# Patient Record
Sex: FEMALE | Race: AMERICAN INDIAN OR ALASKA NATIVE | NOT HISPANIC OR LATINO | URBAN - METROPOLITAN AREA
[De-identification: names, ages, dates, MRNs, and addresses within clinical notes are randomized per-mention and may not be internally consistent; named-entity substitution may affect disease eponyms.]

---

## 2020-03-11 DIAGNOSIS — H91.90 HEARING LOSS: Primary | ICD-10-CM

## 2020-03-12 ENCOUNTER — APPOINTMENT (OUTPATIENT)
Dept: AUDIOLOGY | Age: 55
End: 2020-03-12

## 2020-03-13 ENCOUNTER — HOSPITAL ENCOUNTER (OUTPATIENT)
Dept: AUDIOLOGY | Age: 55
Discharge: HOME | End: 2020-03-13
Attending: FAMILY MEDICINE

## 2020-03-13 PROCEDURE — 92557 COMPREHENSIVE HEARING TEST: CPT | Performed by: AUDIOLOGIST

## 2020-03-13 PROCEDURE — 92550 TYMPANOMETRY & REFLEX THRESH: CPT | Performed by: AUDIOLOGIST

## 2020-07-06 ENCOUNTER — OFFICE VISIT (OUTPATIENT)
Dept: OTOLARYNGOLOGY | Facility: CLINIC | Age: 55
End: 2020-07-06
Payer: MEDICAID

## 2020-07-06 VITALS
DIASTOLIC BLOOD PRESSURE: 86 MMHG | TEMPERATURE: 99 F | BODY MASS INDEX: 25.61 KG/M2 | WEIGHT: 150 LBS | HEIGHT: 64 IN | SYSTOLIC BLOOD PRESSURE: 142 MMHG

## 2020-07-06 DIAGNOSIS — H90.72 MIXED CONDUCTIVE AND SENSORINEURAL HEARING LOSS OF LEFT EAR WITH UNRESTRICTED HEARING OF RIGHT EAR: Primary | ICD-10-CM

## 2020-07-06 PROCEDURE — 99243 OFF/OP CNSLTJ NEW/EST LOW 30: CPT | Performed by: OTOLARYNGOLOGY

## 2020-07-06 NOTE — PROGRESS NOTES
Sudha Mercedes is a 53year old female. Patient presents with:  Hearing Loss: decreased hearing in left ears for  a while.  hearing test done on 3/13/20        HISTORY OF PRESENT ILLNESS  7/6/2020   Here for evaluation of  Left worse than right hearing file    Relationships      Social connections:        Talks on phone: Not on file        Gets together: Not on file        Attends Spiritism service: Not on file        Active member of club or organization: Not on file        Attends meetings of clubs or Normal Conjunctiva - Right: Normal, Left: Normal. Pupil - Right: Normal, Left: Normal. EOMI.  EMELYN   Ears abNormal  normal to inspection ear canals are normal tympanic membranes are normal     Audiogram was not done today but the outside audiogram was revie

## 2021-01-04 ENCOUNTER — TELEPHONE (OUTPATIENT)
Dept: OTOLARYNGOLOGY | Facility: CLINIC | Age: 56
End: 2021-01-04

## 2021-01-04 ENCOUNTER — OFFICE VISIT (OUTPATIENT)
Dept: OTOLARYNGOLOGY | Facility: CLINIC | Age: 56
End: 2021-01-04
Payer: MEDICAID

## 2021-01-04 VITALS
WEIGHT: 149 LBS | TEMPERATURE: 98 F | SYSTOLIC BLOOD PRESSURE: 145 MMHG | BODY MASS INDEX: 25.44 KG/M2 | HEIGHT: 64 IN | DIASTOLIC BLOOD PRESSURE: 88 MMHG

## 2021-01-04 DIAGNOSIS — R22.0 MASS OF LEFT SUBMANDIBULAR REGION: Primary | ICD-10-CM

## 2021-01-04 PROCEDURE — 3077F SYST BP >= 140 MM HG: CPT | Performed by: OTOLARYNGOLOGY

## 2021-01-04 PROCEDURE — 99214 OFFICE O/P EST MOD 30 MIN: CPT | Performed by: OTOLARYNGOLOGY

## 2021-01-04 PROCEDURE — 38505 NEEDLE BIOPSY LYMPH NODES: CPT | Performed by: OTOLARYNGOLOGY

## 2021-01-04 PROCEDURE — 3008F BODY MASS INDEX DOCD: CPT | Performed by: OTOLARYNGOLOGY

## 2021-01-04 PROCEDURE — 3079F DIAST BP 80-89 MM HG: CPT | Performed by: OTOLARYNGOLOGY

## 2021-01-04 NOTE — PATIENT INSTRUCTIONS
Submandibular Gland Excision  The submandibular glands are 2 of the glands that make saliva. They lie just below each side of the jaw. Saliva flows from the glands to the mouth through a small duct. This duct can become blocked.  This may be due to small · Great care is taken to protect the branch of the facial nerve that lies near the submandibular gland. A facial nerve monitor may be used to map the exact location of this nerve. This is a machine with a small sensor that is put onto your cheek.   · The gl · Get up and walk each day. This helps improve blood flow and breathing and promotes healing.  But don't do strenuous activities or exercise for 7 days after surgery.   When to call your healthcare provider  Call your healthcare provider right away if you h

## 2021-01-04 NOTE — TELEPHONE ENCOUNTER
I received an ultrasound from [facility] this was dated December 23, 2020 this was not ordered by me in the ultrasound of the submandibular gland regions was normal

## 2021-01-04 NOTE — PROGRESS NOTES
Rey Bolanos is a 63year old female. Patient presents with:   Other: patient stated had ultrasound of neck ,per PCP left mandibular gland is bigger than the right and was referred to see an ENT,denies any pain or discomfort        HISTORY OF PRESENT on file      Highest education level: Not on file    Occupational History      Not on file    Social Needs      Financial resource strain: Not on file      Food insecurity        Worry: Not on file        Inability: Not on file      Transportation needs and rash. Hema/Lymph Negative Easy bleeding and easy bruising.      PHYSICAL EXAM    /88 (BP Location: Right arm)   Temp 97.6 °F (36.4 °C) (Tympanic)   Ht 5' 4\" (1.626 m)   Wt 149 lb (67.6 kg)   BMI 25.58 kg/m²     System Findings Details   Skin No she also had a CAT scan of the temporal bone. He did not think that he can help her with regard to hearing improvement.     2. Submandibular gland mass  Discussed the possibilities: inflammatory versus neoplastic I do suspect this is most likely to be neop

## 2021-01-07 ENCOUNTER — TELEPHONE (OUTPATIENT)
Dept: OTOLARYNGOLOGY | Facility: CLINIC | Age: 56
End: 2021-01-07

## 2021-01-07 DIAGNOSIS — R22.0 MASS OF LEFT SUBMANDIBULAR REGION: Primary | ICD-10-CM

## 2021-02-04 ENCOUNTER — TELEPHONE (OUTPATIENT)
Dept: OTOLARYNGOLOGY | Facility: CLINIC | Age: 56
End: 2021-02-04

## 2021-02-04 NOTE — TELEPHONE ENCOUNTER
Pt called to request the order for the ct scan be faxed to [facility] at [phone].  Call pt

## 2021-03-05 ENCOUNTER — TELEPHONE (OUTPATIENT)
Dept: OTOLARYNGOLOGY | Facility: CLINIC | Age: 56
End: 2021-03-05

## 2021-03-05 NOTE — TELEPHONE ENCOUNTER
Will need NPI and facility address in order to have authorization updated. Please advise. Or a contact ph# for me to reach out to [facility].

## 2021-03-08 NOTE — TELEPHONE ENCOUNTER
[facility] has been updated. Patient notified.  Please fax the order to [facility].

## 2021-03-15 ENCOUNTER — TELEPHONE (OUTPATIENT)
Dept: OTOLARYNGOLOGY | Facility: CLINIC | Age: 56
End: 2021-03-15

## 2021-03-16 NOTE — TELEPHONE ENCOUNTER
Kamilah Ferguson MD  [facility] Clinical Staff 15 hours ago (5:25 PM)     I reviewed her outside CAT scan this did show that there is a salivary stone in her submandibular gland.  Please ask her to schedule an appointment so that we can discuss removal of the gl

## 2021-03-17 ENCOUNTER — TELEPHONE (OUTPATIENT)
Dept: OTOLARYNGOLOGY | Facility: CLINIC | Age: 56
End: 2021-03-17

## 2021-03-25 NOTE — TELEPHONE ENCOUNTER
Jean Mac MD  [facility] Clinical Staff 15 hours ago (5:25 PM)      I reviewed her outside CAT scan this did show that there is a salivary stone in her submandibular gland.  Please ask her to schedule an appointment so that we can discuss removal of the g

## 2021-03-26 NOTE — TELEPHONE ENCOUNTER
Patient returned call and notified with understanding. Appointment scheduled 4/12, her schedule is limited due to her work schedule.

## 2021-03-29 NOTE — TELEPHONE ENCOUNTER
Future Appointments   Date Time Provider Sravan Ocampo   4/12/2021  4:40 PM Kwesi Esquivel MD [address] [facility]

## 2021-04-12 ENCOUNTER — OFFICE VISIT (OUTPATIENT)
Dept: OTOLARYNGOLOGY | Facility: CLINIC | Age: 56
End: 2021-04-12
Payer: MEDICAID

## 2021-04-12 VITALS
BODY MASS INDEX: 26.29 KG/M2 | TEMPERATURE: 98 F | HEART RATE: 80 BPM | WEIGHT: 154 LBS | DIASTOLIC BLOOD PRESSURE: 86 MMHG | SYSTOLIC BLOOD PRESSURE: 132 MMHG | HEIGHT: 64 IN

## 2021-04-12 DIAGNOSIS — K11.5 SIALOLITHIASIS OF SUBMANDIBULAR GLAND: Primary | ICD-10-CM

## 2021-04-12 PROCEDURE — 3075F SYST BP GE 130 - 139MM HG: CPT | Performed by: OTOLARYNGOLOGY

## 2021-04-12 PROCEDURE — 3008F BODY MASS INDEX DOCD: CPT | Performed by: OTOLARYNGOLOGY

## 2021-04-12 PROCEDURE — 3079F DIAST BP 80-89 MM HG: CPT | Performed by: OTOLARYNGOLOGY

## 2021-04-12 PROCEDURE — 99213 OFFICE O/P EST LOW 20 MIN: CPT | Performed by: OTOLARYNGOLOGY

## 2021-04-12 NOTE — PROGRESS NOTES
Iraida Molina is a 63year old female. No chief complaint on file. HISTORY OF PRESENT ILLNESS  7/6/2020   Here for evaluation of  Left worse than right hearing loss.   She states that she was aware that her left ear was worse than her right for Years of education: Not on file      Highest education level: Not on file    Occupational History      Not on file    Tobacco Use      Smoking status: Never Smoker      Smokeless tobacco: Never Used    Substance and Sexual Activity      Alcohol use: Never bleeding and easy bruising. PHYSICAL EXAM    There were no vitals taken for this visit. System Findings Details   Skin Normal Inspection - Normal. No suspicious lesions bruises or masses.    Constitutional Normal Overall appearance - Normal.   Head/F

## (undated) NOTE — LETTER
[address] [place] [address], [address] [place] [address] [address] Mikey Abreu, [address] [facility]       07/06/20        Patient: Germania Ryan   YOB: 1965   Date of Visit: 7/6/2020       Dear  Dr. Gabe Lopez Recipients,      Thank you for referring Krys Avery

## (undated) NOTE — LETTER
[address] [place] [address], [address] [place] [address] [address] Mikey Abreu, [address] [facility]       07/06/20        Patient: Elzbieta Banegas   YOB: 1965   Date of Visit: 7/6/2020       Dear   [address] [place] [address],      Thank you for referring Lizzie Dougherty